# Patient Record
Sex: FEMALE | Race: WHITE | Employment: FULL TIME | URBAN - METROPOLITAN AREA
[De-identification: names, ages, dates, MRNs, and addresses within clinical notes are randomized per-mention and may not be internally consistent; named-entity substitution may affect disease eponyms.]

---

## 2019-09-18 ENCOUNTER — OFFICE VISIT (OUTPATIENT)
Dept: ORTHOPEDIC SURGERY | Age: 59
End: 2019-09-18

## 2019-09-18 VITALS
HEIGHT: 67 IN | BODY MASS INDEX: 30.76 KG/M2 | SYSTOLIC BLOOD PRESSURE: 128 MMHG | WEIGHT: 196 LBS | HEART RATE: 71 BPM | DIASTOLIC BLOOD PRESSURE: 86 MMHG | TEMPERATURE: 98 F | OXYGEN SATURATION: 98 %

## 2019-09-18 DIAGNOSIS — M43.16 SPONDYLOLISTHESIS AT L4-L5 LEVEL: Primary | ICD-10-CM

## 2019-09-18 DIAGNOSIS — M54.16 LUMBAR RADICULAR PAIN: ICD-10-CM

## 2019-09-18 RX ORDER — ESTRADIOL 0.5 MG/1
1 TABLET ORAL DAILY
COMMUNITY

## 2019-09-18 RX ORDER — GABAPENTIN 100 MG/1
CAPSULE ORAL
Qty: 90 CAP | Refills: 1 | Status: SHIPPED | OUTPATIENT
Start: 2019-09-18 | End: 2020-08-06

## 2019-09-18 RX ORDER — MELATONIN
DAILY
COMMUNITY

## 2019-09-18 RX ORDER — METHYLPREDNISOLONE 4 MG/1
TABLET ORAL
Qty: 1 DOSE PACK | Refills: 0 | Status: SHIPPED | OUTPATIENT
Start: 2019-09-18 | End: 2020-02-13 | Stop reason: ALTCHOICE

## 2019-09-18 RX ORDER — SERTRALINE HYDROCHLORIDE 50 MG/1
25 TABLET, FILM COATED ORAL DAILY
COMMUNITY

## 2019-09-18 RX ORDER — KETOROLAC TROMETHAMINE 15 MG/ML
60 INJECTION, SOLUTION INTRAMUSCULAR; INTRAVENOUS ONCE
Qty: 1 VIAL | Refills: 0
Start: 2019-09-18 | End: 2019-09-18

## 2019-09-18 NOTE — PATIENT INSTRUCTIONS
Spondylolysis and Spondylolisthesis: Exercises  Your Care Instructions  Here are some examples of typical rehabilitation exercises for your condition. Start each exercise slowly. Ease off the exercise if you start to have pain. Your doctor or physical therapist will tell you when you can start these exercises and which ones will work best for you. How to do the exercises  Single knee-to-chest    1. Lie on your back with your knees bent and your feet flat on the floor. You can put a small pillow under your head and neck if it is more comfortable. 2. Bring one knee to your chest, keeping the other foot flat on the floor. 3. Keep your lower back pressed to the floor. Hold for 15 to 30 seconds. 4. Relax, and lower the knee to the starting position. 5. Repeat with the other leg. Repeat 2 to 4 times with each leg. 6. To get more stretch, put your other leg flat on the floor while pulling your knee to your chest.    Double knee-to-chest    1. Lie on your back with your knees bent and your feet flat on the floor. You can put a small pillow under your head and neck if it is more comfortable. 2. Bring both knees to your chest.  3. Keep your lower back pressed to the floor. Hold for 15 to 30 seconds. 4. Relax, and lower your knees to the starting position. 5. Repeat 2 to 4 times. Alternate arm and leg (bird dog) exercise    1. Start on the floor, on your hands and knees. 2. Tighten your belly muscles by pulling your belly button in toward your spine. Be sure you continue to breathe normally and do not hold your breath. 3. Raise one arm off the floor, and hold it straight out in front of you. Be careful not to let your shoulder drop down, because that will twist your trunk. 4. Hold for about 6 seconds, then lower your arm and switch to your other arm. 5. Repeat 8 to 12 times on each arm. 6. When you can do this exercise with ease and no pain, repeat steps 1 through 5.  But this time do it with one leg raised off the floor, holding your leg straight out behind you. Be careful not to let your hip drop down, because that will twist your trunk. 7. When holding your leg straight out becomes easier, try raising your opposite arm at the same time, and repeat steps 1 through 5. Bridging    1. Lie on your back with both knees bent. Your knees should be bent about 90 degrees. 2. Then push your feet into the floor, squeeze your buttocks, and lift your hips off the floor until your shoulders, hips, and knees are all in a straight line. 3. Hold for about 6 seconds as you continue to breathe normally, and then slowly lower your hips back down to the floor and rest for up to 10 seconds. 4. Repeat 8 to 12 times. Curl-ups    1. Lie on the floor on your back with your knees bent at a 90-degree angle. Your feet should be flat on the floor, about 12 inches from your buttocks. 2. Cross your arms over your chest. If this bothers your neck, try putting your hands behind your neck (not your head), with your elbows spread apart. 3. Slowly tighten your belly muscles and raise your shoulder blades off the floor. 4. Keep your head in line with your body, and do not press your chin to your chest.  5. Hold this position for 1 or 2 seconds, then slowly lower yourself back down to the floor. 6. Repeat 8 to 12 times. Plank    1. Lie on your stomach, resting your upper body on your forearms. 2. Tighten your belly muscles by pulling your belly button in toward your spine. 3. Keeping your knees on the floor, press down with your forearms to lift your upper body off the floor. 4. Hold for about 6 seconds, then lower your body to the floor. Rest for up to 10 seconds. 5. Repeat 8 to 12 times. 6. Over time, work up to holding for 15 to 30 seconds each time. 7. If this exercise is easy to do with your knees on the floor, try doing this exercise with your knees and legs straight, supported by your toes on the floor.     Follow-up care is a key part of your treatment and safety. Be sure to make and go to all appointments, and call your doctor if you are having problems. It's also a good idea to know your test results and keep a list of the medicines you take. Where can you learn more? Go to http://luis-horace.info/. Enter [phone] in the search box to learn more about \"Spondylolysis and Spondylolisthesis: Exercises. \"  Current as of: September 20, 2018  Content Version: 12.1  © 1333-5146 [employer]. Care instructions adapted under license by [employer] (which disclaims liability or warranty for this information). If you have questions about a medical condition or this instruction, always ask your healthcare professional. [address] any warranty or liability for your use of this information.

## 2019-09-18 NOTE — PROGRESS NOTES
[address]  [address], [address]  [facility], [address]  Phone: [phone]  Fax: [phone]  NEW PATIENT  Patient: Ava Dorsey                MRN: [id number]       SSN: [id number]  YOB: 1960        AGE: 64 y.o. SEX: female  Body mass index is 31.16 kg/m². PCP: Eligio Camejo MD  09/18/19    No chief complaint on file. Ava Dorsey is seen today in consultation at the request of PCP for complaints of Back and LLE     HISTORY OF PRESENT ILLNESS:  Ava Dorsey is a 64 y.o.  female with history of chronic low back pain for 5 years and more recent increase in LLE pain in the L5-S1 distribution all the way down the leg. She denies any recent injury or activity associated w/ her increased pain. She was on Gabapentin a few years ago w/ benefit. Prior history of back problems: recurrent self limited episodes of low back pain in the past, osteoarthritis of lumbar spine, Grade I L4-5 spondy. She has tried; PT-none recent,  Non-opioid medications Yes, and spinal injections No. Pain is aching, burning, numbing and radiating. Pain is worse with manual/sedentary work, walking and affects sleep, work and recreational activities  and her ability to perform ADLs. Pain is better with nothing. She reports functional weakness due to her leg pain. Denies bladder/bowel dysfunction, saddle paresthesia, weakness, gait disturbance, or other neurological deficits. Denies chills, fever,night sweats, unexplained weight loss/weight gain, chest pain, sob or anxiety. Pt at this time desires to  continue with current care/proceed with medication evaluation/proceed with evaluation of back pain. Medications Was previously on Gabapentin a few years ago w/ benefit. Motrin 800mg w/ minimal benefit,     PMHx: Has abd hernia, R knee meniscus tear w/ surgery,     Exam: +LLE SLR w/ nerve tension, functional weakness, unable to toe bare weight.     RADIOGRAPHS  CS Xrays 12/2014 from [facility]  Negative cervical spine series    LS Xrays 12/2014 from [facility]  Degenerative facet changes are worst at the lower lumbar spine. Slight L4-5 anterolisthesis. Small soft tissue calcification on the right at the L5 level likely outside the ureter but if a stone is clinically suspected, consider CT stone study. ABD CT 5/2019 from [facility]  Degenerative grade 1 anterolisthesis of L4 on L5.    ASSESSMENT   Diagnoses and all orders for this visit:    1. Spondylolisthesis at L4-L5 level  -     MRI LUMB SPINE WO CONT; Future  -     KETOROLAC TROMETHAMINE INJ  -     IL THER/PROPH/DIAG INJECTION, SUBCUT/IM    2. Lumbar radicular pain  -     MRI LUMB SPINE WO CONT; Future  -     gabapentin (NEURONTIN) 100 mg capsule; Take 1 Tab QHS x 1 wk, then 1 Tab BID x1 wk, then 1 tab TID x1 wk    Other orders  -     ketorolac (TORADOL) 15 mg/mL soln injection; 4 mL by IntraMUSCular route once for 1 dose. -     methylPREDNISolone (MEDROL, ASHU,) 4 mg tablet; Per dose pack instructions         IMPRESSION AND PLAN:  This is a pt with a known spondy, facet arthropathy. She went to PT in the distant past and has had various meds over the years for her back and leg pain. She has never had pain this severe before. She has an abnormal neuro exam. She agrees to get an MRI and treat this w/ some anti-inflammatory medication. > Pt was given information on MRI   > MRI abn exam  > Toradol followed by [facility]  > Gabapentin   > Ms. Jovon Duarte has a reminder for a \"due or due soon\" health maintenance.  I have asked that she contact her primary care provider, Lety Robins MD, for follow-up on this health maintenance.  > We have informed patient to notify us for immediate appointment if he has any worsening neurogical symptoms or if an emergency situation presents, then call 911  >  has been reviewed and is appropriate  > Pt will follow-up in 4 wks w/ MD.    Subjective    Work Re[employer]    Smoking Status non smoker    Pain Scale: 8/10    Pain Assessment  9/18/2019   Location of Pain Leg   Location Modifiers Left   Severity of Pain 8   Quality of Pain Aching   Duration of Pain Persistent   Frequency of Pain Constant   Aggravating Factors Other (Comment)   Aggravating Factors Comment sitting   Relieving Factors Rest         REVIEW OF SYSTEMS  Constitutional: Negative for fever, chills, or weight change. Respiratory: Negative for cough or shortness of breath. Cardiovascular: Negative for chest pain or palpitations. Gastrointestinal: Negative for incontinence, acid reflux, change in bowel habits, or constipation. Genitourinary: Negative for incontinence, dysuria and flank pain. Musculoskeletal: Positive for back and LLE pain. See HPI. Skin: Negative for rash. Neurological:LLE L5-S1  radiculopathy. See HPI. Endo/Heme/Allergies: Negative. Psychiatric/Behavioral: Negative. PHYSICAL EXAMINATION  Visit Vitals  /86 (BP 1 Location: Left arm, BP Patient Position: Sitting)   Pulse 71   Temp 98 °F (36.7 °C) (Oral)   Ht 5' 6.5\" (1.689 m)   Wt 196 lb (88.9 kg)   SpO2 98%   BMI 31.16 kg/m²         Accompanied by Self. Constitutional:  Well developed, well nourished, in no acute distress. Psychiatric: Affect and mood are appropriate. Integumentary: No rashes or abrasions noted on exposed areas. Cardiovascular/Peripheral Vascular: +2 radial & pedal pulses. No peripheral edema is noted. Lymphatic:  No evidence of lymphedema. No cervical lymphadenopathy. SPINE/MUSCULOSKELETAL EXAM     Lumbar spine:  No rash, ecchymosis, or gross obliquity. No fasciculations. No focal atrophy is noted. Range of motion is intact with flexion, extension, turning right, turning left. Tenderness to palpation L low back L5-S1 w/ muscle tension. No tenderness to palpation at the sciatic notch. SI joints non-tender. Trochanters non tender.       Straight leg raise + LLE    Sensation grossly intact to light touch.      MOTOR:     Hip Flex Quads Hamstrings Ankle DF EHL Ankle PF   Right 5/5 5/5 5/5 5/5 5/5 5/5   Left 5/5 5/5 5/5 4/5 5/5 4/5       DTRs are 1+ biceps, triceps, brachioradialis, patella, and Achilles. Ambulation without assistive device. FWB. Unable to toe bare weight        PAST MEDICAL HISTORY   No past medical history on file. No past surgical history on file. Alem Mcdonald MEDICATIONS      Current Outpatient Medications   Medication Sig Dispense Refill    estradiol (ESTRACE) 0.5 mg tablet Take  by mouth daily.  sertraline (ZOLOFT) 50 mg tablet Take  by mouth daily.  cholecalciferol (VITAMIN D3) 1,000 unit tablet Take  by mouth daily.  Omega-3 Fatty Acids (FISH OIL) 500 mg cap Take  by mouth.  ketorolac (TORADOL) 15 mg/mL soln injection 4 mL by IntraMUSCular route once for 1 dose.  1 Vial 0    methylPREDNISolone (MEDROL, ASHU,) 4 mg tablet Per dose pack instructions 1 Dose Pack 0    gabapentin (NEURONTIN) 100 mg capsule Take 1 Tab QHS x 1 wk, then 1 Tab BID x1 wk, then 1 tab TID x1 wk 90 Cap 1        ALLERGIES    Allergies   Allergen Reactions    Augmentin [Amoxicillin-Pot Clavulanate] Other (comments)     Flu like symptoms          SOCIAL HISTORY    Social History     Socioeconomic History    Marital status:      Spouse name: Not on file    Number of children: Not on file    Years of education: Not on file    Highest education level: Not on file   Occupational History    Not on file   Social Needs    Financial resource strain: Not on file    Food insecurity:     Worry: Not on file     Inability: Not on file    Transportation needs:     Medical: Not on file     Non-medical: Not on file   Tobacco Use    Smoking status: Never Smoker    Smokeless tobacco: Never Used   Substance and Sexual Activity    Alcohol use: Not on file    Drug use: Not on file    Sexual activity: Not on file   Lifestyle    Physical activity:     Days per week: Not on file     Minutes per session: Not on file    Stress: Not on file   Relationships    Social connections:     Talks on phone: Not on file     Gets together: Not on file     Attends Sabianism service: Not on file     Active member of club or organization: Not on file     Attends meetings of clubs or organizations: Not on file     Relationship status: Not on file    Intimate partner violence:     Fear of current or ex partner: Not on file     Emotionally abused: Not on file     Physically abused: Not on file     Forced sexual activity: Not on file   Other Topics Concern    Not on file   Social History Narrative    Not on file       FAMILY HISTORY    Family History   Problem Relation Age of Onset    Breast Cancer Maternal Aunt 28         Matilde Matute NP

## 2019-09-18 NOTE — PROGRESS NOTES
Josiane Ac 1960 female who presents for Toradol 60 mg. Patient denies any symptoms , reactions or allergies that would exclude them from being injected today. Risks and adverse reactions were discussed and the VIS was given to them. All questions were addressed. Order placed for Laney Mckenna,  per Verbal Order from Trung Harvey with read back. Patient was observed for 15 min post injection. There were no reactions observed.     Aron Garcia

## 2019-12-27 ENCOUNTER — TELEPHONE (OUTPATIENT)
Dept: ORTHOPEDIC SURGERY | Age: 59
End: 2019-12-27

## 2019-12-27 NOTE — TELEPHONE ENCOUNTER
Patient called stating that she is in severe pain and that she needs to be seen as soon as possible. I tried offering her an appointment as 1/20/20 but she cannot wait that long. She is not sure what she should do.  Please advise patient at [phone]

## 2019-12-27 NOTE — TELEPHONE ENCOUNTER
Returned call to patient, verified Name/, per patient, she has been trying to get her MRI completed at [address] [place] [address]. Per patient, she has called her doctor several times, has been unable to get anyone to fax over the order. Informed patient, our office is the office that ordered her MRI, therefore she should've called our office. Informed patient, we have no record of her calling our office to fax over any orders to MRI/CT Diagnostics. Informed patient, her MRI order was put in as a generic order, therefore she received a call from our scheduling dept. Informed patient, at that time, she informed the scheduling dept she would like a different location, would call our office to inform. Reminded patient, our office never received a call from, therefore the fax was never sent. Scheduled patient to f/u with Dr. Dion Hogue on 20, soonest available appt. Informed patient, she will need to have her MRI completed prior to appt, to bring disc as we do not have access to their images. Informed patient, I will fax order over for her, place her on cancellation list for any sooner appts. Informed patient I will forward message to Dr. Leif coronado to discuss with Dr. Dion Hogue to see if there is a sooner appt she may be fit into. Patient verbalized agreement/understanding.

## 2020-01-29 DIAGNOSIS — M54.16 LUMBAR RADICULAR PAIN: ICD-10-CM

## 2020-01-29 DIAGNOSIS — M43.16 SPONDYLOLISTHESIS AT L4-L5 LEVEL: ICD-10-CM

## 2020-02-13 ENCOUNTER — OFFICE VISIT (OUTPATIENT)
Dept: ORTHOPEDIC SURGERY | Age: 60
End: 2020-02-13

## 2020-02-13 VITALS
SYSTOLIC BLOOD PRESSURE: 115 MMHG | RESPIRATION RATE: 20 BRPM | DIASTOLIC BLOOD PRESSURE: 72 MMHG | BODY MASS INDEX: 31.14 KG/M2 | WEIGHT: 198.4 LBS | OXYGEN SATURATION: 94 % | HEIGHT: 67 IN | HEART RATE: 74 BPM | TEMPERATURE: 97.5 F

## 2020-02-13 DIAGNOSIS — G89.29 CHRONIC PAIN OF LEFT KNEE: ICD-10-CM

## 2020-02-13 DIAGNOSIS — M54.59 MECHANICAL LOW BACK PAIN: ICD-10-CM

## 2020-02-13 DIAGNOSIS — M79.18 CHRONIC PRIMARY MUSCULOSKELETAL PAIN: Primary | ICD-10-CM

## 2020-02-13 DIAGNOSIS — M54.16 LUMBAR NEURITIS: ICD-10-CM

## 2020-02-13 DIAGNOSIS — G89.29 CHRONIC PRIMARY MUSCULOSKELETAL PAIN: Primary | ICD-10-CM

## 2020-02-13 DIAGNOSIS — M79.18 MYOFASCIAL PAIN: ICD-10-CM

## 2020-02-13 DIAGNOSIS — M25.562 CHRONIC PAIN OF LEFT KNEE: ICD-10-CM

## 2020-02-13 RX ORDER — BUPIVACAINE HYDROCHLORIDE 2.5 MG/ML
4 INJECTION, SOLUTION INFILTRATION; PERINEURAL ONCE
Qty: 4 ML | Refills: 0
Start: 2020-02-13 | End: 2020-02-13

## 2020-02-13 RX ORDER — LIDOCAINE HYDROCHLORIDE 20 MG/ML
4 INJECTION, SOLUTION EPIDURAL; INFILTRATION; INTRACAUDAL; PERINEURAL ONCE
Qty: 4 ML | Refills: 0
Start: 2020-02-13 | End: 2020-02-13

## 2020-02-13 RX ORDER — IBUPROFEN 800 MG/1
800 TABLET ORAL DAILY
COMMUNITY

## 2020-02-13 RX ORDER — BETAMETHASONE SODIUM PHOSPHATE AND BETAMETHASONE ACETATE 3; 3 MG/ML; MG/ML
12 INJECTION, SUSPENSION INTRA-ARTICULAR; INTRALESIONAL; INTRAMUSCULAR; SOFT TISSUE ONCE
Qty: 2 ML | Refills: 0
Start: 2020-02-13 | End: 2020-02-13

## 2020-02-13 NOTE — PROGRESS NOTES
[address]  [address], [address]  [facility], [address]  Phone: [phone]  Fax: [phone]        Donn Alla  : 1960  PCP: Nathan Shipley MD  2020    PROGRESS NOTE      HISTORY OF PRESENT ILLNESS  Mati Jenkins is a 64 y.o. female who was seen as a new patient 19 by Bakari Alicea NP with c/o chronic low back pain x 5 years with more recent increase in LLE pain in an L5/S1 distribution. She denied injury or trauma. She previously took Gabapentin with benefit. Pt reported functional weakness due to her leg pain. Mati Jenkins comes in to the office today for f/u. Pt reports severe pain radiating into the LLE circumferentially, especially into the anterior thigh and some calf pain. Pt notes that she constantly has anterior thigh pain and she finds relief with applying pressure to the left thigh. She has found benefit from oral steroids and chiropractic care. She has pain worse after prolonged sitting when she tries to transition to standing. Pt notes that she had her left knee evaluated at the [facility] and notes that she was cleared. Pt reports that her father has vascular Parkinson's disease. Lumbar spine MRI dated 2020 reviewed. Per report, L4-5: Grade 1 degenerative anterolisthesis with advanced bilateral facet arthropathy. Mild right lateral recess narrowing due to facet disease. L5-S1: Small central disc protrusion and posterior radial annular fissure. Pt reports known left inguinal hernia. 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 She rates her pain as a 10/10 today. ASSESSMENT  Her pain is likely myofascial in nature with trigger points causing referred pain into the LLE. There is a potential component of neuritis related to annular tear. PLAN  1.  Trigger point injections into lumbar region - this appeared helpful for her back symptoms, but did not seem to help her posterior knee. 2. Referral to PT with optional dry needling [facility])  3. Referral to Dr. Neftaly Ratliff for left knee pain. Pt will f/u in 8 weeks or sooner as needed. Diagnoses and all orders for this visit:    1. Chronic primary musculoskeletal pain  -     betamethasone (CELESTONE SOLUSPAN) 6 mg/mL injection; 2 mL by IntraMUSCular route once for 1 dose.  -     BETAMETHASONE ACETATE & SODIUM PHOSPHATE INJECTION 3 MG EA.  -     TN INJECT TRIGGER POINTS, > 3  -     bupivacaine (MARCAINE) 0.25 % (2.5 mg/mL) soln injection; 4 mL by IntraMUSCular route once for 1 dose. -     INJECTION, BUPIVICAINE HYDRO  -     LIDOCAINE INJECTION  -     lidocaine, PF, (XYLOCAINE) 20 mg/mL (2 %) injection; 4 mL by Other route once for 1 dose.  -     REFERRAL TO PHYSICAL THERAPY    2. Mechanical low back pain  -     betamethasone (CELESTONE SOLUSPAN) 6 mg/mL injection; 2 mL by IntraMUSCular route once for 1 dose.  -     BETAMETHASONE ACETATE & SODIUM PHOSPHATE INJECTION 3 MG EA.  -     TN INJECT TRIGGER POINTS, > 3  -     bupivacaine (MARCAINE) 0.25 % (2.5 mg/mL) soln injection; 4 mL by IntraMUSCular route once for 1 dose. -     INJECTION, BUPIVICAINE HYDRO  -     LIDOCAINE INJECTION  -     lidocaine, PF, (XYLOCAINE) 20 mg/mL (2 %) injection; 4 mL by Other route once for 1 dose.  -     REFERRAL TO PHYSICAL THERAPY    3. Myofascial pain  -     betamethasone (CELESTONE SOLUSPAN) 6 mg/mL injection; 2 mL by IntraMUSCular route once for 1 dose.  -     BETAMETHASONE ACETATE & SODIUM PHOSPHATE INJECTION 3 MG EA.  -     TN INJECT TRIGGER POINTS, > 3  -     bupivacaine (MARCAINE) 0.25 % (2.5 mg/mL) soln injection; 4 mL by IntraMUSCular route once for 1 dose. -     INJECTION, BUPIVICAINE HYDRO  -     LIDOCAINE INJECTION  -     lidocaine, PF, (XYLOCAINE) 20 mg/mL (2 %) injection; 4 mL by Other route once for 1 dose.  -     REFERRAL TO PHYSICAL THERAPY         PAST MEDICAL HISTORY   No past medical history on file.     History reviewed. No pertinent surgical history. Alexis Brewer MEDICATIONS    Current Outpatient Medications   Medication Sig Dispense Refill    ibuprofen (MOTRIN) 800 mg tablet Take 800 mg by mouth daily.  estradiol (ESTRACE) 0.5 mg tablet Take 1 mg by mouth daily.  sertraline (ZOLOFT) 50 mg tablet Take 50 mg by mouth daily.  cholecalciferol (VITAMIN D3) 1,000 unit tablet Take  by mouth daily.  Omega-3 Fatty Acids (FISH OIL) 500 mg cap Take 1,000 mg by mouth daily.  gabapentin (NEURONTIN) 100 mg capsule Take 1 Tab QHS x 1 wk, then 1 Tab BID x1 wk, then 1 tab TID x1 wk (Patient not taking: Reported on 2/13/2020) 90 Cap 1        ALLERGIES  Allergies   Allergen Reactions    Augmentin [Amoxicillin-Pot Clavulanate] Other (comments)     Flu like symptoms          SOCIAL HISTORY    Social History     Socioeconomic History    Marital status:      Spouse name: Not on file    Number of children: Not on file    Years of education: Not on file    Highest education level: Not on file   Tobacco Use    Smoking status: Never Smoker    Smokeless tobacco: Never Used       FAMILY HISTORY  Family History   Problem Relation Age of Onset    Breast Cancer Maternal Aunt 35         REVIEW OF SYSTEMS  Review of Systems   Musculoskeletal: Positive for back pain. LLE pain          PHYSICAL EXAMINATION  Visit Vitals  /72 (BP 1 Location: Right arm, BP Patient Position: Sitting)   Pulse 74   Temp 97.5 °F (36.4 °C) (Oral)   Resp 20   Ht 5' 6.5\" (1.689 m)   Wt 198 lb 6.4 oz (90 kg)   SpO2 94%   BMI 31.54 kg/m²       Pain Assessment  2/13/2020   Location of Pain Back;Leg   Location Modifiers Left   Severity of Pain 10   Quality of Pain Sharp   Duration of Pain Persistent   Frequency of Pain Constant   Aggravating Factors -   Aggravating Factors Comment -   Relieving Factors -   Result of Injury No           Constitutional:  Well developed, well nourished, in no acute distress.    Psychiatric: Affect and mood are appropriate. Integumentary: No rashes or abrasions noted on exposed areas. SPINE/MUSCULOSKELETAL EXAM    Anant Rooj, NP 9/18/19:  Lumbar spine:  No rash, ecchymosis, or gross obliquity. No fasciculations. No focal atrophy is noted. Range of motion is intact with flexion, extension, turning right, turning left. Tenderness to palpation L low back L5-S1 w/ muscle tension. No tenderness to palpation at the sciatic notch. SI joints non-tender. Trochanters non tender.       Straight leg raise + LLE     Sensation grossly intact to light touch. Updates 2/13/2020:    Lumbar spine:  No rash, ecchymosis, or gross obliquity. No fasciculations. No focal atrophy is noted. No pain with hip ROM. Full range of motion. Tenderness to palpation of QL on the L. No tenderness to palpation at the sciatic notch. SI joints non-tender. Trochanters non tender. No tenderness to palpation of piriformis. No pain with lumbar extension or flexion. No pain reproduced with provocative movements of the left hip. Decreased sensation in left anterior thigh. MOTOR:      Biceps  Triceps Deltoids Wrist Ext Wrist Flex Hand Intrin   Right 5/5 5/5 5/5 5/5 5/5 5/5   Left 5/5 5/5 5/5 5/5 5/5 5/5             Hip Flex  Quads Hamstrings Ankle DF EHL Ankle PF   Right 5/5 5/5 5/5 5/5 5/5 5/5   Left 5/5 5/5 5/5 5/5 5/5 5/5     DTRs are 1+ biceps, triceps, brachioradialis, patella, and Achilles. Negative Straight Leg raise. Squat not tested. No difficulty with tandem gait. Ambulation without assistive device. FWB. RADIOGRAPHS  Lumbar MRI images taken on 1/17/2020 personally reviewed with patient:  Alignment: 4.5 mm anterolisthesis of L4. Degenerative disc disease: L5-S1 minimal (3). Marrow: No evidence of marrow edema or focal suspicious marrow abnormalities. Vertebral bodies: No compression fractures. Conus: L1. Normal signal intensity/morphology. Lower thoracic spine: Unremarkable.   Upper sacrum: Unremarkable. L5-S1: Posterior radial annular fissure terminating in a high-intensity zone. Minimal posterior annular bulge and small central disc protrusion. No evidence of central or lateral recess stenosis. No evidence of advanced foraminal narrowing. Patent foramina. L4-5: Minimal posterior annular bulge and uncovering of the posterior disc. Mild right lateral recess narrowing due to facet disease. Moderate right and moderate-to-severe left facet arthropathy. Lateral extension of the annular bulge leads to minimal right and mild left foraminal narrowing. L3-4: Minimal posterior annular bulge. No evidence of central or lateral recess stenosis. Patent foramina. L2-3: Minimal posterior annular bulge. No evidence of central or lateral recess stenosis. Patent foramina. L1-2: Posterior disc margin is unremarkable. No evidence of central or lateral recess stenosis. Patent foramina. Miscellaneous: Posterior radial annular fissures at L2-3, L3-4, and L4-5. Comparison: No significant interval change. Disc degeneration number refers to modified Pfirrman criteria. IMPRESSION:  L4-5: Grade 1 degenerative anterolisthesis with advanced bilateral facet arthropathy. Mild right lateral recess narrowing due to facet disease. L5-S1: Small central disc protrusion and posterior radial annular fissure. [facility]  OFFICE PROCEDURE PROGRESS NOTE      PROCEDURE: In the office today after informed consent using aseptic technique, I administered 10 trigger point injections to the iliocastalis, longissimus, spinalis, multifidus, semispinalis, and deep rotators with a total of 2 cc of Celestone, 4 cc each of Lidocaine and Marcaine. Pre-injection pain level was 9/10 and post-injection pain level was 5/10.      Chart reviewed for the following:   I, Dr. Ami Everett, have reviewed the History, Physical and updated the Allergic reactions for [facility] Griselda CLARK performed immediately prior to start of procedure:   I, Dr. Jasmin Melton, have performed the following reviews on Jud Villalobos prior to the start of the procedure:            * Patient was identified by name and date of birth   * Agreement on procedure being performed was verified  * Risks and Benefits explained to the patient  * Procedure site verified and marked as necessary  * Patient was positioned for comfort  * Consent was signed and verified     Time: 3:06 PM    Date of procedure: 2/13/2020    Procedure performed by:  Martin Bailey MD    Provider assisted by: None    Patient assisted by: Self    How tolerated by patient: Pt tolerated the procedure well with no complications. 35 minutes of face-to-face contact were spent with the patient during today's visit extensively discussing symptoms and treatment plan. All questions were answered. More than half of this visit today was spent on counseling.      Written by Frida Crabtree as dictated by Jasmin Melton MD

## 2020-08-06 ENCOUNTER — OFFICE VISIT (OUTPATIENT)
Dept: VASCULAR SURGERY | Age: 60
End: 2020-08-06

## 2020-08-06 VITALS
RESPIRATION RATE: 15 BRPM | DIASTOLIC BLOOD PRESSURE: 84 MMHG | OXYGEN SATURATION: 100 % | HEIGHT: 67 IN | HEART RATE: 78 BPM | BODY MASS INDEX: 31.08 KG/M2 | SYSTOLIC BLOOD PRESSURE: 120 MMHG | WEIGHT: 198 LBS

## 2020-08-06 DIAGNOSIS — I83.813 VARICOSE VEINS OF BOTH LOWER EXTREMITIES WITH PAIN: ICD-10-CM

## 2020-08-06 DIAGNOSIS — M19.90 ARTHRITIS: ICD-10-CM

## 2020-08-06 DIAGNOSIS — M79.605 PAIN OF LEFT LOWER EXTREMITY: Primary | ICD-10-CM

## 2020-08-06 DIAGNOSIS — E66.09 CLASS 1 OBESITY DUE TO EXCESS CALORIES WITHOUT SERIOUS COMORBIDITY WITH BODY MASS INDEX (BMI) OF 31.0 TO 31.9 IN ADULT: ICD-10-CM

## 2020-08-06 NOTE — PROGRESS NOTES
Brenton Villalobos    Chief Complaint   Patient presents with    New Patient       HPI    Brenton Villalobos is a 64 y.o. female who is seen in the office today at the request of Dr. Mark Lowry for left calf pain and varicose veins. Patient states that she has been having pain in her left calf for years. She states that it is getting severe and she is to the point that she is having difficulty sleeping at night or getting comfortable due to the pain. She states that she has to readjust her position of sitting or lying down many times and sometimes has to get up and walk around. She does not have much success relieving the pain in general.  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 She states that it can be quite debilitating at times. She denies any fevers or chills. She does notice an area of swelling or bulging on her posterior calf but does not report any generalized swelling in the leg or foot. She did have an ultrasound done which was negative for DVT. She has no history of DVT. She does have history of arthritis and there has been some discussion about possible knee replacement. She does have some varicose veins on the right leg as well but is not complaining of any pain in this area. She does try to get off of her feet and elevate them at the end of the day and states this is helpful for short period. She has not tried any type of compression stockings as of yet. She does state that the pain can sometimes radiate from her calf down to her heel. She states that otherwise she is a fairly healthy individual and remains quite active. 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   She denies any history of nonhealing ulcers. History reviewed. No pertinent past medical history. There is no problem list on file for this patient. Past Surgical History:   Procedure Laterality Date    HX ORTHOPAEDIC      meniscus repair/July 2020     Current Outpatient Medications   Medication Sig Dispense Refill    fluticasone propionate (FLONASE NA) by Nasal route.  ibuprofen (MOTRIN) 800 mg tablet Take 800 mg by mouth daily.  estradiol (ESTRACE) 0.5 mg tablet Take 1 mg by mouth daily.  sertraline (ZOLOFT) 50 mg tablet Take 25 mg by mouth daily.  cholecalciferol (VITAMIN D3) 1,000 unit tablet Take  by mouth daily.  Omega-3 Fatty Acids (FISH OIL) 500 mg cap Take 1,000 mg by mouth daily.        Allergies   Allergen Reactions    Augmentin [Amoxicillin-Pot Clavulanate] Other (comments)     Flu like symptoms     Social History     Socioeconomic History    Marital status:      Spouse name: Not on file    Number of children: Not on file    Years of education: Not on file    Highest education level: Not on file   Occupational History    Not on file   Social Needs    Financial resource strain: Not on file    Food insecurity     Worry: Not on file     Inability: Not on file    Transportation needs     Medical: Not on file     Non-medical: Not on file   Tobacco Use    Smoking status: Never Smoker    Smokeless tobacco: Never Used   Substance and Sexual Activity    Alcohol use: Not on file    Drug use: Not on file    Sexual activity: Not on file   Lifestyle    Physical activity     Days per week: Not on file     Minutes per session: Not on file    Stress: Not on file   Relationships    Social connections     Talks on phone: Not on file     Gets together: Not on file     Attends Restorationist service: Not on file     Active member of club or organization: Not on file     Attends meetings of clubs or organizations: Not on file     Relationship status: Not on file    Intimate partner violence     Fear of current or ex partner: Not on file     Emotionally abused: Not on file     Physically abused: Not on file     Forced sexual activity: Not on file   Other Topics Concern    Not on file   Social History Narrative    Not on file      Family History   Problem Relation Age of Onset    Breast Cancer Maternal Aunt 35       Review of Systems    Constitutional: negative   HEENT: negative   Respiratory: negative   Cardiovascular: negative   Gastrointestinal: negative   Genitourinary:negative   Hematologic/lymphatic: negative   Musculoskeletal: Positive for varicose veins in the bilateral lower extremities, left calf pain  Neurological: negative   Behavioral/Psych: negative   Endocrine: negative   Allergic/Immunologic: negative      Physical Exam:    Visit Vitals  /84 (BP 1 Location: Left arm, BP Patient Position: Sitting)   Pulse 78   Resp 15   Ht 5' 6.5\" (1.689 m)   Wt 198 lb (89.8 kg)   SpO2 100%   BMI 31.48 kg/m²      General: Well-appearing female in no acute distress. Patient is wearing a facemask  HEENT: EOMI, no scleral icterus is noted. Cardiovascular: RRR, palpable pedal pulses bilaterally. Pulmonary: No increased work or breathing is noted. Clear to auscultation bilaterally. No wheeze, rales or rhonchi. Abdomen: obese, soft, nondistended. Extremities: Warm and well perfused bilaterally. She does not have any significant edema in the bilateral lower extremities. She does have some scattered varicosities mostly in the lower legs bilaterally. She does have 1 large bulging varicosity on the posterior mid left calf. No ulcers. No skin changes  Neuro: Cranial nerves II through XII are grossly intact   Integument: No ulcerations are identified visibly      Impression and Plan:  Karen Villalobos is a 64 y.o. female with chronic left calf pain and varicose veins.   I did discuss that we will obtain venous reflux studies to assess for any underlying venous insufficiency that could be contributing to her symptoms. I did also recommend that she give a trial of compression stockings and continue to elevate her legs as often as able. I did also discuss a trial of horse [place] seed to see if this helps with her pain. I am unsure if this is truly vascular in nature but we will certainly move forward with further imaging and evaluation. She will follow-up after her ultrasounds for further surgical discussion. She is understanding to call the office sooner if needed. Patient expresses understanding to all of this and agrees to the plan. She states that she will get compression stockings and start wearing them today. We reviewed the plan with the patient and the patient understands. We also gave the patient appropriate instructions on their disease process and when to call back. Greater than 50% of this visit was spent with face to face discussion. PLEASE NOTE:  This document has been produced using voice recognition software. Unrecognized errors in transcription may be present.

## 2021-01-12 ENCOUNTER — OFFICE VISIT (OUTPATIENT)
Dept: ORTHOPEDIC SURGERY | Age: 61
End: 2021-01-12
Payer: COMMERCIAL

## 2021-01-12 VITALS
SYSTOLIC BLOOD PRESSURE: 128 MMHG | HEART RATE: 79 BPM | TEMPERATURE: 97.6 F | BODY MASS INDEX: 28.46 KG/M2 | DIASTOLIC BLOOD PRESSURE: 85 MMHG | WEIGHT: 179 LBS

## 2021-01-12 DIAGNOSIS — M43.16 SPONDYLOLISTHESIS AT L4-L5 LEVEL: ICD-10-CM

## 2021-01-12 DIAGNOSIS — M47.816 LUMBAR FACET ARTHROPATHY: Primary | ICD-10-CM

## 2021-01-12 DIAGNOSIS — M47.816 LUMBAR SPONDYLOSIS: ICD-10-CM

## 2021-01-12 PROCEDURE — 99213 OFFICE O/P EST LOW 20 MIN: CPT | Performed by: NURSE PRACTITIONER

## 2021-01-12 NOTE — PROGRESS NOTES
Chief complaint   Chief Complaint   Patient presents with    Back Pain     fu        History of Present Illness:  Ryanne Goncalves is a  64 y.o.  female comes in today after last being seen by Dr. Mary Crandall February 13, 2020. She states since then she has had a left torn meniscus repair by Dr. Anastasia Lemons and did well with that but still has left-sided leg pain and they have told her it is not coming from her knee anymore and that it is coming from her back so she is return here for reevaluation. She states she gets right-sided back pain that radiates into her left lower extremity down to her heel. She describes it as a sharp shooting pain. She denies numbness and tingling. She states she bought an inversion table and is always repositioning as well as using ibuprofen but still has the pain. She did physical therapy but it really did not help. 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 She denies fever bowel bladder dysfunction. MRI from January 2020 did show a grade 1 anterolisthesis at L4-5 with advanced bilateral facet arthropathy and L5-S1 small central disc protrusion. The facet arthropathy was moderate on the right and moderate to severe on the left. Physical Exam: Patient is a 56-year-old female well-developed well-nourished who is alert and oriented with a normal mood and affect. She has a full weightbearing nonantalgic gait. She did not use any assistive device. She has 5 out of 5 strength bilateral lower extremities. Negative straight leg raise. She has pain with hyperextension lumbar spine. Assessment and Plan: This is a patient who has anterior listhesis with facet arthropathy and a central disc protrusion. She has back pain on the right and radiating left lower extremity pain down to her heel on the left. I feel like it may be from the facet arthropathy on both sides and encroaching on the left nerve root. So we are going to order a bilateral L4-5 facet block.   We will see her back after the block. Review of systems:    History reviewed. No pertinent past medical history. Past Surgical History:   Procedure Laterality Date    HX ORTHOPAEDIC      meniscus repair/July 2020     Social History     Socioeconomic History    Marital status:      Spouse name: Not on file    Number of children: Not on file    Years of education: Not on file    Highest education level: Not on file   Occupational History    Not on file   Social Needs    Financial resource strain: Not on file    Food insecurity     Worry: Not on file     Inability: Not on file    Transportation needs     Medical: Not on file     Non-medical: Not on file   Tobacco Use    Smoking status: Never Smoker    Smokeless tobacco: Never Used   Substance and Sexual Activity    Alcohol use: Not on file    Drug use: Not on file    Sexual activity: Not on file   Lifestyle    Physical activity     Days per week: Not on file     Minutes per session: Not on file    Stress: Not on file   Relationships    Social connections     Talks on phone: Not on file     Gets together: Not on file     Attends Restoration service: Not on file     Active member of club or organization: Not on file     Attends meetings of clubs or organizations: Not on file     Relationship status: Not on file    Intimate partner violence     Fear of current or ex partner: Not on file     Emotionally abused: Not on file     Physically abused: Not on file     Forced sexual activity: Not on file   Other Topics Concern    Not on file   Social History Narrative    Not on file     Family History   Problem Relation Age of Onset    Breast Cancer Maternal Aunt 28       Physical Exam:  Visit Vitals  /85 (BP 1 Location: Left arm, BP Patient Position: Sitting)   Pulse 79   Temp 97.6 °F (36.4 °C) (Temporal)   Wt 179 lb (81.2 kg)   BMI 28.46 kg/m²     Pain Scale: 0 - No pain/10       has been . reviewed and is appropriate          Diagnoses and all orders for this visit:    1. Lumbar facet arthropathy  -     SCHEDULE SURGERY    2. Spondylolisthesis at L4-L5 level  -     SCHEDULE SURGERY    3. Lumbar spondylosis  -     SCHEDULE SURGERY            Follow-up and Dispositions    · Return for after block.              We have informed Byron Mon to notify us for immediate appointment if she has any worsening neurogical symptoms or if an emergency situation presents, then call [phone]